# Patient Record
Sex: FEMALE | Race: WHITE | NOT HISPANIC OR LATINO | URBAN - METROPOLITAN AREA
[De-identification: names, ages, dates, MRNs, and addresses within clinical notes are randomized per-mention and may not be internally consistent; named-entity substitution may affect disease eponyms.]

---

## 2021-07-28 ENCOUNTER — EMERGENCY (EMERGENCY)
Facility: HOSPITAL | Age: 66
LOS: 1 days | Discharge: HOME | End: 2021-07-28
Attending: EMERGENCY MEDICINE
Payer: MEDICARE

## 2021-07-28 VITALS
HEART RATE: 77 BPM | OXYGEN SATURATION: 97 % | RESPIRATION RATE: 18 BRPM | HEIGHT: 67 IN | SYSTOLIC BLOOD PRESSURE: 163 MMHG | DIASTOLIC BLOOD PRESSURE: 77 MMHG | TEMPERATURE: 98 F | WEIGHT: 160.06 LBS

## 2021-07-28 VITALS
OXYGEN SATURATION: 96 % | RESPIRATION RATE: 14 BRPM | HEART RATE: 83 BPM | SYSTOLIC BLOOD PRESSURE: 107 MMHG | DIASTOLIC BLOOD PRESSURE: 96 MMHG

## 2021-07-28 LAB
ALBUMIN SERPL ELPH-MCNC: 4.3 G/DL — SIGNIFICANT CHANGE UP (ref 3.3–5)
ALP SERPL-CCNC: 86 U/L — SIGNIFICANT CHANGE UP (ref 40–120)
ALT FLD-CCNC: 14 U/L — SIGNIFICANT CHANGE UP (ref 10–45)
ANION GAP SERPL CALC-SCNC: 13 MMOL/L — SIGNIFICANT CHANGE UP (ref 5–17)
APPEARANCE UR: CLEAR — SIGNIFICANT CHANGE UP
AST SERPL-CCNC: 22 U/L — SIGNIFICANT CHANGE UP (ref 10–40)
BACTERIA # UR AUTO: NEGATIVE — SIGNIFICANT CHANGE UP
BASOPHILS # BLD AUTO: 0.07 K/UL — SIGNIFICANT CHANGE UP (ref 0–0.2)
BASOPHILS NFR BLD AUTO: 0.9 % — SIGNIFICANT CHANGE UP (ref 0–2)
BILIRUB SERPL-MCNC: 0.2 MG/DL — SIGNIFICANT CHANGE UP (ref 0.2–1.2)
BILIRUB UR-MCNC: NEGATIVE — SIGNIFICANT CHANGE UP
BUN SERPL-MCNC: 11 MG/DL — SIGNIFICANT CHANGE UP (ref 7–23)
CALCIUM SERPL-MCNC: 9.4 MG/DL — SIGNIFICANT CHANGE UP (ref 8.4–10.5)
CHLORIDE SERPL-SCNC: 104 MMOL/L — SIGNIFICANT CHANGE UP (ref 96–108)
CO2 SERPL-SCNC: 22 MMOL/L — SIGNIFICANT CHANGE UP (ref 22–31)
COLOR SPEC: SIGNIFICANT CHANGE UP
CREAT SERPL-MCNC: 0.78 MG/DL — SIGNIFICANT CHANGE UP (ref 0.5–1.3)
DIFF PNL FLD: NEGATIVE — SIGNIFICANT CHANGE UP
EOSINOPHIL # BLD AUTO: 0.2 K/UL — SIGNIFICANT CHANGE UP (ref 0–0.5)
EOSINOPHIL NFR BLD AUTO: 2.6 % — SIGNIFICANT CHANGE UP (ref 0–6)
EPI CELLS # UR: 1 /HPF — SIGNIFICANT CHANGE UP
GLUCOSE SERPL-MCNC: 129 MG/DL — HIGH (ref 70–99)
GLUCOSE UR QL: NEGATIVE — SIGNIFICANT CHANGE UP
HCT VFR BLD CALC: 36.6 % — SIGNIFICANT CHANGE UP (ref 34.5–45)
HGB BLD-MCNC: 12.4 G/DL — SIGNIFICANT CHANGE UP (ref 11.5–15.5)
HYALINE CASTS # UR AUTO: 1 /LPF — SIGNIFICANT CHANGE UP (ref 0–2)
IMM GRANULOCYTES NFR BLD AUTO: 0.7 % — SIGNIFICANT CHANGE UP (ref 0–1.5)
KETONES UR-MCNC: NEGATIVE — SIGNIFICANT CHANGE UP
LEUKOCYTE ESTERASE UR-ACNC: ABNORMAL
LYMPHOCYTES # BLD AUTO: 2.43 K/UL — SIGNIFICANT CHANGE UP (ref 1–3.3)
LYMPHOCYTES # BLD AUTO: 31.9 % — SIGNIFICANT CHANGE UP (ref 13–44)
MCHC RBC-ENTMCNC: 31.6 PG — SIGNIFICANT CHANGE UP (ref 27–34)
MCHC RBC-ENTMCNC: 33.9 GM/DL — SIGNIFICANT CHANGE UP (ref 32–36)
MCV RBC AUTO: 93.4 FL — SIGNIFICANT CHANGE UP (ref 80–100)
MONOCYTES # BLD AUTO: 0.58 K/UL — SIGNIFICANT CHANGE UP (ref 0–0.9)
MONOCYTES NFR BLD AUTO: 7.6 % — SIGNIFICANT CHANGE UP (ref 2–14)
NEUTROPHILS # BLD AUTO: 4.29 K/UL — SIGNIFICANT CHANGE UP (ref 1.8–7.4)
NEUTROPHILS NFR BLD AUTO: 56.3 % — SIGNIFICANT CHANGE UP (ref 43–77)
NITRITE UR-MCNC: NEGATIVE — SIGNIFICANT CHANGE UP
NRBC # BLD: 0 /100 WBCS — SIGNIFICANT CHANGE UP (ref 0–0)
PH UR: 6 — SIGNIFICANT CHANGE UP (ref 5–8)
PLATELET # BLD AUTO: 277 K/UL — SIGNIFICANT CHANGE UP (ref 150–400)
POTASSIUM SERPL-MCNC: 3.6 MMOL/L — SIGNIFICANT CHANGE UP (ref 3.5–5.3)
POTASSIUM SERPL-SCNC: 3.6 MMOL/L — SIGNIFICANT CHANGE UP (ref 3.5–5.3)
PROT SERPL-MCNC: 7.4 G/DL — SIGNIFICANT CHANGE UP (ref 6–8.3)
PROT UR-MCNC: NEGATIVE — SIGNIFICANT CHANGE UP
RBC # BLD: 3.92 M/UL — SIGNIFICANT CHANGE UP (ref 3.8–5.2)
RBC # FLD: 12.5 % — SIGNIFICANT CHANGE UP (ref 10.3–14.5)
RBC CASTS # UR COMP ASSIST: 2 /HPF — SIGNIFICANT CHANGE UP (ref 0–4)
SODIUM SERPL-SCNC: 139 MMOL/L — SIGNIFICANT CHANGE UP (ref 135–145)
SP GR SPEC: 1.01 — SIGNIFICANT CHANGE UP (ref 1.01–1.02)
TROPONIN T, HIGH SENSITIVITY RESULT: <6 NG/L — SIGNIFICANT CHANGE UP (ref 0–51)
UROBILINOGEN FLD QL: NEGATIVE — SIGNIFICANT CHANGE UP
WBC # BLD: 7.62 K/UL — SIGNIFICANT CHANGE UP (ref 3.8–10.5)
WBC # FLD AUTO: 7.62 K/UL — SIGNIFICANT CHANGE UP (ref 3.8–10.5)
WBC UR QL: 28 /HPF — HIGH (ref 0–5)

## 2021-07-28 PROCEDURE — 99285 EMERGENCY DEPT VISIT HI MDM: CPT

## 2021-07-28 PROCEDURE — 93010 ELECTROCARDIOGRAM REPORT: CPT

## 2021-07-28 PROCEDURE — 71046 X-RAY EXAM CHEST 2 VIEWS: CPT | Mod: 26

## 2021-07-28 RX ORDER — SODIUM CHLORIDE 9 MG/ML
1000 INJECTION, SOLUTION INTRAVENOUS ONCE
Refills: 0 | Status: COMPLETED | OUTPATIENT
Start: 2021-07-28 | End: 2021-07-28

## 2021-07-28 RX ADMIN — SODIUM CHLORIDE 1000 MILLILITER(S): 9 INJECTION, SOLUTION INTRAVENOUS at 20:46

## 2021-07-28 RX ADMIN — SODIUM CHLORIDE 1000 MILLILITER(S): 9 INJECTION, SOLUTION INTRAVENOUS at 22:08

## 2021-07-28 NOTE — ED ADULT NURSE NOTE - OBJECTIVE STATEMENT
68 y/o 67 y/o female presenting to ED by EMS, A&Ox3, complaining of syncope. Pt states after she had dinner she started experiencing cramping abdominal pain and "feeling foggy", pt went to the bathroom and was found by  unconscious on the bathroom floor. Pt states she felt dizzy and lightheaded after one episode of diarrhea and then fainted. Denies taking anticoagulants.  Pt denies head trauma, headache, changes in vision, chest pain, shortness of breath, fevers/chills, n/v, hematuria, bloody diarrhea, weakness, numbness or tingling, pain. Neuro intact, extremities equal in sensation and strength, lungs clear, abd soft nontender, no swelling/edema noted. Pt placed on cardiac monitor showing NSR. Safety and comfort measures provided, bed locked and in lowest position, side rails up for safety. Call bell within reach. Awaiting results.

## 2021-07-28 NOTE — ED PROVIDER NOTE - NSFOLLOWUPCLINICS_GEN_ALL_ED_FT
[facility]  Neurology  [address] [place] [address] [address] [place] [address]  Phone: [phone]  Fax:

## 2021-07-28 NOTE — ED PROVIDER NOTE - OBJECTIVE STATEMENT
65 y/o F, PMH of hypothyroidism, presents to ED s/p syncope episode. Pt reports that around 6pm today she started feeling lightheaded, and felt abd cramping. She went to the bathroom to have a BM, and then fainted while on the toilet.  heard the fall and found pt unresponsive in the bathroom. Denies shaking or incontinence. Pt was not out for about 1 min, and when she woke up she was back to baseline. Pt states "I feel fine." Pt denies head injury, HA, vision changes, CP, SOB, abd pain, n/v/d, weakness, numbness, dizziness, or any other symptoms at this time.

## 2021-07-28 NOTE — ED PROVIDER NOTE - PROGRESS NOTE DETAILS
Delgado Braswell PGY2: Pt was re-evaluated at bedside, VSS, feeling better overall. Results were discussed with patient as well as return precautions and follow up plan with PCP and/or specialist. Time was taken to answer any questions that the patient had before providing them with discharge paperwork.

## 2021-07-28 NOTE — ED PROVIDER NOTE - ATTENDING CONTRIBUTION TO CARE
diarrhea x 1 , got up then loc. no inj. feeling fine now  no spinal ttp  clear lung rrr  ekg / bg / ivf / lytes

## 2021-07-28 NOTE — ED PROVIDER NOTE - NSFOLLOWUPINSTRUCTIONS_ED_ALL_ED_FT
Syncope    Syncope is when you temporarily lose consciousness, also called fainting or passing out. It is caused by a sudden decrease in blood flow to the brain. Even though most causes of syncope are not dangerous, syncope can possibly be a sign of a serious medical problem. Signs that you may be about to faint include feeling dizzy, lightheaded, nausea, visual changes, or cold/clammy skin. Do not drive, operate heavy machinery, or play sports until your health care provider says it is okay.    SEEK IMMEDIATE MEDICAL CARE IF YOU HAVE ANY OF THE FOLLOWING SYMPTOMS: severe headache, pain in your chest/abdomen/back, bleeding from your mouth or rectum, palpitations, shortness of breath, pain with breathing, seizure, confusion, or trouble walking.    Please follow up with neurology within 1 week.

## 2021-07-28 NOTE — ED PROVIDER NOTE - CLINICAL SUMMARY MEDICAL DECISION MAKING FREE TEXT BOX
67 y/o F, PMH of hypothyroidism, presents to ED s/p syncope episode. Prodrome lightheadedness. Happened while having BM. No shaking, tongue biting, or incontinence. Lasted about 1 min. Denies head injury. No post ictal state.  VSS. Physical exam unremarkable.  Likely vasovagal syncope. Plan to check labs, EKG, UA, trop, CXR, and reassess.

## 2021-07-28 NOTE — ED PROVIDER NOTE - PATIENT PORTAL LINK FT
You can access the FollowMyHealth Patient Portal offered by [facility] by registering at the following website: [email or web address]. By joining [facility]’s FollowMyHealth portal, you will also be able to view your health information using other applications (apps) compatible with our system.

## 2021-07-29 ENCOUNTER — INPATIENT (INPATIENT)
Facility: HOSPITAL | Age: 66
LOS: 1 days | Discharge: HOME | DRG: 392 | End: 2021-07-31
Attending: INTERNAL MEDICINE | Admitting: INTERNAL MEDICINE
Payer: MEDICARE

## 2021-07-29 VITALS
RESPIRATION RATE: 18 BRPM | WEIGHT: 160.06 LBS | DIASTOLIC BLOOD PRESSURE: 85 MMHG | OXYGEN SATURATION: 98 % | TEMPERATURE: 98 F | HEART RATE: 109 BPM | HEIGHT: 67 IN | SYSTOLIC BLOOD PRESSURE: 167 MMHG

## 2021-07-29 DIAGNOSIS — K52.9 NONINFECTIVE GASTROENTERITIS AND COLITIS, UNSPECIFIED: ICD-10-CM

## 2021-07-29 LAB
ALBUMIN SERPL ELPH-MCNC: 4.5 G/DL — SIGNIFICANT CHANGE UP (ref 3.3–5)
ALP SERPL-CCNC: 94 U/L — SIGNIFICANT CHANGE UP (ref 40–120)
ALT FLD-CCNC: 13 U/L — SIGNIFICANT CHANGE UP (ref 10–45)
ANION GAP SERPL CALC-SCNC: 12 MMOL/L — SIGNIFICANT CHANGE UP (ref 5–17)
AST SERPL-CCNC: 20 U/L — SIGNIFICANT CHANGE UP (ref 10–40)
BASE EXCESS BLDV CALC-SCNC: 1.1 MMOL/L — SIGNIFICANT CHANGE UP (ref -2–2)
BASOPHILS # BLD AUTO: 0.05 K/UL — SIGNIFICANT CHANGE UP (ref 0–0.2)
BASOPHILS NFR BLD AUTO: 0.3 % — SIGNIFICANT CHANGE UP (ref 0–2)
BILIRUB SERPL-MCNC: 0.7 MG/DL — SIGNIFICANT CHANGE UP (ref 0.2–1.2)
BLD GP AB SCN SERPL QL: NEGATIVE — SIGNIFICANT CHANGE UP
BUN SERPL-MCNC: 11 MG/DL — SIGNIFICANT CHANGE UP (ref 7–23)
CA-I SERPL-SCNC: 1.2 MMOL/L — SIGNIFICANT CHANGE UP (ref 1.12–1.3)
CALCIUM SERPL-MCNC: 9.9 MG/DL — SIGNIFICANT CHANGE UP (ref 8.4–10.5)
CHLORIDE BLDV-SCNC: 102 MMOL/L — SIGNIFICANT CHANGE UP (ref 96–108)
CHLORIDE SERPL-SCNC: 99 MMOL/L — SIGNIFICANT CHANGE UP (ref 96–108)
CO2 BLDV-SCNC: 28 MMOL/L — SIGNIFICANT CHANGE UP (ref 22–30)
CO2 SERPL-SCNC: 22 MMOL/L — SIGNIFICANT CHANGE UP (ref 22–31)
CREAT SERPL-MCNC: 0.7 MG/DL — SIGNIFICANT CHANGE UP (ref 0.5–1.3)
CRP SERPL-MCNC: 48 MG/L — HIGH (ref 0–4)
CULTURE RESULTS: SIGNIFICANT CHANGE UP
EOSINOPHIL # BLD AUTO: 0.01 K/UL — SIGNIFICANT CHANGE UP (ref 0–0.5)
EOSINOPHIL NFR BLD AUTO: 0.1 % — SIGNIFICANT CHANGE UP (ref 0–6)
ERYTHROCYTE [SEDIMENTATION RATE] IN BLOOD: 31 MM/HR — HIGH (ref 0–20)
GAS PNL BLDV: 136 MMOL/L — SIGNIFICANT CHANGE UP (ref 135–145)
GAS PNL BLDV: SIGNIFICANT CHANGE UP
GAS PNL BLDV: SIGNIFICANT CHANGE UP
GLUCOSE BLDV-MCNC: 124 MG/DL — HIGH (ref 70–99)
GLUCOSE SERPL-MCNC: 117 MG/DL — HIGH (ref 70–99)
HCO3 BLDV-SCNC: 26 MMOL/L — SIGNIFICANT CHANGE UP (ref 21–29)
HCT VFR BLD CALC: 40.1 % — SIGNIFICANT CHANGE UP (ref 34.5–45)
HCT VFR BLDA CALC: 45 % — SIGNIFICANT CHANGE UP (ref 39–50)
HGB BLD CALC-MCNC: 14.7 G/DL — SIGNIFICANT CHANGE UP (ref 11.5–15.5)
HGB BLD-MCNC: 13.3 G/DL — SIGNIFICANT CHANGE UP (ref 11.5–15.5)
IMM GRANULOCYTES NFR BLD AUTO: 0.6 % — SIGNIFICANT CHANGE UP (ref 0–1.5)
LACTATE BLDV-MCNC: 2 MMOL/L — SIGNIFICANT CHANGE UP (ref 0.7–2)
LYMPHOCYTES # BLD AUTO: 1.19 K/UL — SIGNIFICANT CHANGE UP (ref 1–3.3)
LYMPHOCYTES # BLD AUTO: 7 % — LOW (ref 13–44)
MCHC RBC-ENTMCNC: 30.7 PG — SIGNIFICANT CHANGE UP (ref 27–34)
MCHC RBC-ENTMCNC: 33.2 GM/DL — SIGNIFICANT CHANGE UP (ref 32–36)
MCV RBC AUTO: 92.6 FL — SIGNIFICANT CHANGE UP (ref 80–100)
MONOCYTES # BLD AUTO: 1.26 K/UL — HIGH (ref 0–0.9)
MONOCYTES NFR BLD AUTO: 7.4 % — SIGNIFICANT CHANGE UP (ref 2–14)
NEUTROPHILS # BLD AUTO: 14.47 K/UL — HIGH (ref 1.8–7.4)
NEUTROPHILS NFR BLD AUTO: 84.6 % — HIGH (ref 43–77)
NRBC # BLD: 0 /100 WBCS — SIGNIFICANT CHANGE UP (ref 0–0)
OTHER CELLS CSF MANUAL: 7 ML/DL — LOW (ref 18–22)
PCO2 BLDV: 47 MMHG — SIGNIFICANT CHANGE UP (ref 35–50)
PH BLDV: 7.37 — SIGNIFICANT CHANGE UP (ref 7.35–7.45)
PLATELET # BLD AUTO: 308 K/UL — SIGNIFICANT CHANGE UP (ref 150–400)
PO2 BLDV: 23 MMHG — LOW (ref 25–45)
POTASSIUM BLDV-SCNC: 3.6 MMOL/L — SIGNIFICANT CHANGE UP (ref 3.5–5.3)
POTASSIUM SERPL-MCNC: 3.8 MMOL/L — SIGNIFICANT CHANGE UP (ref 3.5–5.3)
POTASSIUM SERPL-SCNC: 3.8 MMOL/L — SIGNIFICANT CHANGE UP (ref 3.5–5.3)
PROT SERPL-MCNC: 8.1 G/DL — SIGNIFICANT CHANGE UP (ref 6–8.3)
RBC # BLD: 4.33 M/UL — SIGNIFICANT CHANGE UP (ref 3.8–5.2)
RBC # FLD: 12.4 % — SIGNIFICANT CHANGE UP (ref 10.3–14.5)
RH IG SCN BLD-IMP: POSITIVE — SIGNIFICANT CHANGE UP
SAO2 % BLDV: 35 % — LOW (ref 67–88)
SARS-COV-2 RNA SPEC QL NAA+PROBE: SIGNIFICANT CHANGE UP
SODIUM SERPL-SCNC: 133 MMOL/L — LOW (ref 135–145)
SPECIMEN SOURCE: SIGNIFICANT CHANGE UP
TSH SERPL-MCNC: 0.54 UIU/ML — SIGNIFICANT CHANGE UP (ref 0.27–4.2)
WBC # BLD: 17.09 K/UL — HIGH (ref 3.8–10.5)
WBC # FLD AUTO: 17.09 K/UL — HIGH (ref 3.8–10.5)

## 2021-07-29 PROCEDURE — 74174 CTA ABD&PLVS W/CONTRAST: CPT | Mod: 26,MA

## 2021-07-29 PROCEDURE — 93010 ELECTROCARDIOGRAM REPORT: CPT

## 2021-07-29 PROCEDURE — 99285 EMERGENCY DEPT VISIT HI MDM: CPT

## 2021-07-29 RX ORDER — CIPROFLOXACIN LACTATE 400MG/40ML
400 VIAL (ML) INTRAVENOUS EVERY 12 HOURS
Refills: 0 | Status: DISCONTINUED | OUTPATIENT
Start: 2021-07-29 | End: 2021-07-31

## 2021-07-29 RX ORDER — LEVOTHYROXINE SODIUM 125 MCG
0.14 TABLET ORAL
Qty: 0 | Refills: 0 | DISCHARGE

## 2021-07-29 RX ORDER — SODIUM CHLORIDE 9 MG/ML
1000 INJECTION INTRAMUSCULAR; INTRAVENOUS; SUBCUTANEOUS ONCE
Refills: 0 | Status: COMPLETED | OUTPATIENT
Start: 2021-07-29 | End: 2021-07-29

## 2021-07-29 RX ORDER — CIPROFLOXACIN LACTATE 400MG/40ML
400 VIAL (ML) INTRAVENOUS ONCE
Refills: 0 | Status: COMPLETED | OUTPATIENT
Start: 2021-07-29 | End: 2021-07-29

## 2021-07-29 RX ORDER — METRONIDAZOLE 500 MG
500 TABLET ORAL EVERY 8 HOURS
Refills: 0 | Status: DISCONTINUED | OUTPATIENT
Start: 2021-07-29 | End: 2021-07-31

## 2021-07-29 RX ORDER — METRONIDAZOLE 500 MG
500 TABLET ORAL ONCE
Refills: 0 | Status: COMPLETED | OUTPATIENT
Start: 2021-07-29 | End: 2021-07-29

## 2021-07-29 RX ORDER — LEVOTHYROXINE SODIUM 125 MCG
137 TABLET ORAL
Refills: 0 | Status: DISCONTINUED | OUTPATIENT
Start: 2021-07-29 | End: 2021-07-31

## 2021-07-29 RX ORDER — SODIUM CHLORIDE 9 MG/ML
1000 INJECTION, SOLUTION INTRAVENOUS
Refills: 0 | Status: DISCONTINUED | OUTPATIENT
Start: 2021-07-29 | End: 2021-07-31

## 2021-07-29 RX ADMIN — SODIUM CHLORIDE 1000 MILLILITER(S): 9 INJECTION INTRAMUSCULAR; INTRAVENOUS; SUBCUTANEOUS at 11:18

## 2021-07-29 RX ADMIN — Medication 100 MILLIGRAM(S): at 13:44

## 2021-07-29 RX ADMIN — Medication 200 MILLIGRAM(S): at 23:17

## 2021-07-29 RX ADMIN — Medication 100 MILLIGRAM(S): at 21:48

## 2021-07-29 RX ADMIN — Medication 200 MILLIGRAM(S): at 12:40

## 2021-07-29 RX ADMIN — SODIUM CHLORIDE 75 MILLILITER(S): 9 INJECTION, SOLUTION INTRAVENOUS at 15:34

## 2021-07-29 NOTE — H&P ADULT - HISTORY OF PRESENT ILLNESS
67 yo woman with h/o hypothyroidism and diverticulosis who seen yesterday for a syncopal episode during BM. ptn was evaluated given IVF and sent home. Today continues to have lower abd cramping pain and loose stools x2d. This am noted BRB in the toilet x3 with possible clots. Never had these sx before. No recent abx, blood thinner use or travel. No lightheadedness/dizziness and SOB. Admits to palpitations. reports feeling hungry  had a colonoscopy 7/1/2021, was told she had tics. mammogram is UTD, annual physicals UTD

## 2021-07-29 NOTE — ED PROVIDER NOTE - OBJECTIVE STATEMENT
pt with hypothyroidism and diverticulosis seen yesterday for a syncopal episode during BM continues to have lower abd cramping pain and loose stools x2d. This am noted BRB in the toilet x3 with possible clots. Never had these sx before. No recent abx, blood thinner use or travel. No lightheadedness/dizziness and SOB. Admits to palpitations.

## 2021-07-29 NOTE — CONSULT NOTE ADULT - ASSESSMENT
67 yo woman with h/o hypothyroidism and diverticulosis who seen yesterday for a syncopal episode during BM. ptn was evaluated given IVF and sent home. Today continues to have lower abd cramping pain and loose stools x2d. This am noted BRB in the toilet x3 with possible clots. Never had these sx before. No recent abx, blood thinner use or travel. No lightheadedness/dizziness and SOB. Admits to palpitations. reports feeling hungry  had a colonoscopy 7/1/2021, was told she had tics. mammogram is UTD, annual physicals UTD (29 Jul 2021 12:29)    ER vss, Tmax 99.5 (R), P 86, /77.  WBC 17 <-- 7.6.  CRP 48.  UA large LE, (-) nit.  CTap shows nonspecific enteritis.  Pt states she had catered food the day prior to her abd symptoms, remembers eating chicken.   Denies recent travel, sick contacts, no recent abx exposure.  Pt started on broad spectrum abx.  ID consult called for further abx management.     Infectious enteritis:    - Recently ate catered food prior to onset of symptoms.  c/o lower abd cramping and loose stools with BRB.      - Cont cipro/flagyl.  Check stool gi pcr, stool cx, o&p.  No recent abx exposure to s/o cdiff risk.    - GI eval called    - If pt spikes fever, send bcx x 2    - Monitor serial abd exam, replete electrolytes prn, gentle ivf hydration, pain control    Will follow,    Annamaria Holguin  [phone]

## 2021-07-29 NOTE — CONSULT NOTE ADULT - SUBJECTIVE AND OBJECTIVE BOX
Patient is a 66y old  Female who presents with a chief complaint of colitis (2021 14:54)      HPI:    65 yo woman with h/o hypothyroidism and diverticulosis who seen yesterday for a syncopal episode during BM. ptn was evaluated given IVF and sent home. Today continues to have lower abd cramping pain and loose stools x2d. This am noted BRB in the toilet x3 with possible clots. Never had these sx before. No recent abx, blood thinner use or travel. No lightheadedness/dizziness and SOB. Admits to palpitations. reports feeling hungry  had a colonoscopy 2021, was told she had tics. mammogram is UTD, annual physicals UTD (2021 12:29)    ER vss, Tmax 99.5 (R), P 86, /77.  WBC 17 <-- 7.6.  CRP 48.  UA large LE, (-) nit.  CTap shows nonspecific enteritis.  Pt started on broad spectrum abx.  ID consult called for further abx management.        REVIEW OF SYSTEMS:    CONSTITUTIONAL: No fever, weight loss, or fatigue  EYES: No eye pain, visual disturbances, or discharge  ENMT:  No sore throat  NECK: No pain or stiffness  RESPIRATORY: No cough, wheezing, chills or hemoptysis; No shortness of breath  CARDIOVASCULAR: No chest pain, palpitations, dizziness, or leg swelling  GASTROINTESTINAL: No abdominal or epigastric pain. No nausea, vomiting, or hematemesis; No diarrhea or constipation. No melena or hematochezia.  GENITOURINARY: No dysuria, frequency, hematuria, or incontinence  NEUROLOGICAL: No headaches, memory loss, loss of strength, numbness, or tremors  SKIN: No itching, burning, rashes, or lesions   LYMPH NODES: No enlarged glands  MUSCULOSKELETAL: No joint pain or swelling; No muscle, back, or extremity pain      PAST MEDICAL & SURGICAL HISTORY:  Hypothyroid    Diverticulosis        Allergies    penicillin (Rash)    Intolerances        FAMILY HISTORY:  No pertinent fam hx in 1st degree relatives    SOCIAL HISTORY:        MEDICATIONS  (STANDING):  ciprofloxacin   IVPB 400 milliGRAM(s) IV Intermittent every 12 hours  levothyroxine 137 MICROGram(s) Oral <User Schedule>  metroNIDAZOLE  IVPB 500 milliGRAM(s) IV Intermittent every 8 hours  sodium chloride 0.45%. 1000 milliLiter(s) (75 mL/Hr) IV Continuous <Continuous>    MEDICATIONS  (PRN):      Vital Signs Last 24 Hrs  T(C): 37.2 (2021 16:23), Max: 37.5 (2021 14:44)  T(F): 98.9 (2021 16:23), Max: 99.5 (2021 14:44)  HR: 86 (2021 16:23) (77 - 109)  BP: 124/75 (2021 16:23) (107/96 - 167/85)  BP(mean): 96 (2021 22:06) (96 - 96)  RR: 18 (2021 16:23) (14 - 18)  SpO2: 98% (2021 16:23) (96% - 99%)    PHYSICAL EXAM:    GENERAL: NAD, well-groomed  HEAD:  Atraumatic, Normocephalic  EYES: EOMI, PERRLA, conjunctiva and sclera clear  ENMT: No tonsillar erythema, exudates, or enlargement; Moist mucous membranes  NECK: Supple, No JVD  CHEST/LUNG: Clear to percussion bilaterally; No rales, rhonchi, wheezing, or rubs  HEART: Regular rate and rhythm; No murmurs, rubs, or gallops  ABDOMEN: Soft, Nontender, Nondistended; Bowel sounds present  EXTREMITIES:  2+ Peripheral Pulses, No clubbing, cyanosis, or edema  LYMPH: No lymphadenopathy noted  SKIN: No rashes or lesions    LABS:  CBC Full  -  ( 2021 10:18 )  WBC Count : 17.09 K/uL  RBC Count : 4.33 M/uL  Hemoglobin : 13.3 g/dL  Hematocrit : 40.1 %  Platelet Count - Automated : 308 K/uL  Mean Cell Volume : 92.6 fl  Mean Cell Hemoglobin : 30.7 pg  Mean Cell Hemoglobin Concentration : 33.2 gm/dL  Auto Neutrophil # : 14.47 K/uL  Auto Lymphocyte # : 1.19 K/uL  Auto Monocyte # : 1.26 K/uL  Auto Eosinophil # : 0.01 K/uL  Auto Basophil # : 0.05 K/uL  Auto Neutrophil % : 84.6 %  Auto Lymphocyte % : 7.0 %  Auto Monocyte % : 7.4 %  Auto Eosinophil % : 0.1 %  Auto Basophil % : 0.3 %          133<L>  |  99  |  11  ----------------------------<  117<H>  3.8   |  22  |  0.70    Ca    9.9      2021 10:18    TPro  8.1  /  Alb  4.5  /  TBili  0.7  /  DBili  x   /  AST  20  /  ALT  13  /  AlkPhos  94        LIVER FUNCTIONS - ( 2021 10:18 )  Alb: 4.5 g/dL / Pro: 8.1 g/dL / ALK PHOS: 94 U/L / ALT: 13 U/L / AST: 20 U/L / GGT: x                               MICROBIOLOGY:        Urinalysis Basic - ( 2021 21:11 )    Color: Light Yellow / Appearance: Clear / S.014 / pH: x  Gluc: x / Ketone: Negative  / Bili: Negative / Urobili: Negative   Blood: x / Protein: Negative / Nitrite: Negative   Leuk Esterase: Large / RBC: 2 /hpf / WBC 28 /HPF   Sq Epi: x / Non Sq Epi: 1 /hpf / Bacteria: Negative        RADIOLOGY:    < from: CT Angio Abdomen and Pelvis w/ IV Cont (21 @ 10:06) >  EXAM:  CT ANGIO ABD PELV (W)AW IC                            PROCEDURE DATE:  2021            INTERPRETATION:  CLINICAL INFORMATION: Evaluate for GI bleed.    COMPARISON: None.    CONTRAST/COMPLICATIONS:  IV Contrast: Omnipaque 350  90 cc administered   10 cc discarded  Oral Contrast: None  Complications: None reported at the time of study.    PROCEDURE:  CT of the Abdomen and Pelvis was performed.  Precontrast, Arterial and Delayed phases were performed.  Sagittal and coronal reformats were performed.    FINDINGS:  LOWER CHEST: Within normal limits.    LIVER: Within normal limits.  BILE DUCTS: Normal caliber.  GALLBLADDER: Within normal limits.  SPLEEN: Within normal limits.  PANCREAS: Within normal limits.  ADRENALS: Within normal limits.  KIDNEYS/URETERS: Normal opacification of the kidneys. No hydronephrosis.    BLADDER: Within normal limits.  REPRODUCTIVE ORGANS: Uterus and adnexa within normal limits.    BOWEL: No contrast extravasation to suggest an acute GI bleed. Pericolonic stranding throughout the mid to distal descending colon extending to the proximal sigmoid without any discrete diverticuli. No bowel obstruction. Appendix is normal.  PERITONEUM: No ascites.  VESSELS: Within normal limits.  RETROPERITONEUM/LYMPH NODES: No lymphadenopathy.  ABDOMINAL WALL: Within normal limits.  BONES: Mild degenerative changes of the lumbar spine.    IMPRESSION:    No acute GI bleed.  Findings suggestive of nonspecific enteritis possibly infectious in origin, correlate clinically.    < end of copied text >          < from: Xray Chest 2 Views PA/Lat (21 @ 20:27) >  FINDINGS:    Density at the right heart border, likely epicardial fat. Lungs are clear. No pleural effusion or pneumothorax.  Cardiac size is not enlarged. No acute osseous finding.    IMPRESSION:  Clear lungs    < end of copied text >           Patient is a 66y old  Female who presents with a chief complaint of colitis (2021 14:54)      HPI:    67 yo woman with h/o hypothyroidism and diverticulosis who seen yesterday for a syncopal episode during BM. ptn was evaluated given IVF and sent home. Today continues to have lower abd cramping pain and loose stools x2d. This am noted BRB in the toilet x3 with possible clots. Never had these sx before. No recent abx, blood thinner use or travel. No lightheadedness/dizziness and SOB. Admits to palpitations. reports feeling hungry  had a colonoscopy 2021, was told she had tics. mammogram is UTD, annual physicals UTD (2021 12:29)    ER vss, Tmax 99.5 (R), P 86, /77.  WBC 17 <-- 7.6.  CRP 48.  UA large LE, (-) nit.  CTap shows nonspecific enteritis.  Pt states she had catered food the day prior to her abd symptoms, remembers eating chicken.   Denies recent travel, sick contacts, no recent abx exposure.  Pt started on broad spectrum abx.  ID consult called for further abx management.        REVIEW OF SYSTEMS:    CONSTITUTIONAL: No fever, weight loss, or fatigue  EYES: No eye pain, visual disturbances, or discharge  ENMT:  No sore throat  NECK: No pain or stiffness  RESPIRATORY: No cough, wheezing, chills or hemoptysis; No shortness of breath  CARDIOVASCULAR: No chest pain, palpitations, dizziness, or leg swelling  GASTROINTESTINAL: No abdominal or epigastric pain. No nausea, vomiting, or hematemesis; No diarrhea or constipation. No melena or hematochezia.  GENITOURINARY: No dysuria, frequency, hematuria, or incontinence  NEUROLOGICAL: No headaches, memory loss, loss of strength, numbness, or tremors  SKIN: No itching, burning, rashes, or lesions   LYMPH NODES: No enlarged glands  MUSCULOSKELETAL: No joint pain or swelling; No muscle, back, or extremity pain      PAST MEDICAL & SURGICAL HISTORY:  Hypothyroid    Diverticulosis        Allergies    penicillin (Rash)    Intolerances        FAMILY HISTORY:  No pertinent fam hx in 1st degree relatives    SOCIAL HISTORY:  , retired, homemaker, babysits      MEDICATIONS  (STANDING):  ciprofloxacin   IVPB 400 milliGRAM(s) IV Intermittent every 12 hours  levothyroxine 137 MICROGram(s) Oral <User Schedule>  metroNIDAZOLE  IVPB 500 milliGRAM(s) IV Intermittent every 8 hours  sodium chloride 0.45%. 1000 milliLiter(s) (75 mL/Hr) IV Continuous <Continuous>    MEDICATIONS  (PRN):      Vital Signs Last 24 Hrs  T(C): 37.2 (2021 16:23), Max: 37.5 (2021 14:44)  T(F): 98.9 (2021 16:23), Max: 99.5 (2021 14:44)  HR: 86 (2021 16:23) (77 - 109)  BP: 124/75 (2021 16:23) (107/96 - 167/85)  BP(mean): 96 (2021 22:06) (96 - 96)  RR: 18 (2021 16:23) (14 - 18)  SpO2: 98% (2021 16:23) (96% - 99%)    PHYSICAL EXAM:    GENERAL: NAD, well-groomed  HEAD:  Atraumatic, Normocephalic  EYES: EOMI, PERRLA, conjunctiva and sclera clear  ENMT: No tonsillar erythema, exudates, or enlargement; Moist mucous membranes  NECK: Supple, No JVD  CHEST/LUNG: Clear to percussion bilaterally; No rales, rhonchi, wheezing, or rubs  HEART: Regular rate and rhythm; No murmurs, rubs, or gallops  ABDOMEN: Soft, Nontender, Nondistended; Bowel sounds present  EXTREMITIES:  2+ Peripheral Pulses, No clubbing, cyanosis, or edema  LYMPH: No lymphadenopathy noted  SKIN: No rashes or lesions    LABS:  CBC Full  -  ( 2021 10:18 )  WBC Count : 17.09 K/uL  RBC Count : 4.33 M/uL  Hemoglobin : 13.3 g/dL  Hematocrit : 40.1 %  Platelet Count - Automated : 308 K/uL  Mean Cell Volume : 92.6 fl  Mean Cell Hemoglobin : 30.7 pg  Mean Cell Hemoglobin Concentration : 33.2 gm/dL  Auto Neutrophil # : 14.47 K/uL  Auto Lymphocyte # : 1.19 K/uL  Auto Monocyte # : 1.26 K/uL  Auto Eosinophil # : 0.01 K/uL  Auto Basophil # : 0.05 K/uL  Auto Neutrophil % : 84.6 %  Auto Lymphocyte % : 7.0 %  Auto Monocyte % : 7.4 %  Auto Eosinophil % : 0.1 %  Auto Basophil % : 0.3 %          133<L>  |  99  |  11  ----------------------------<  117<H>  3.8   |  22  |  0.70    Ca    9.9      2021 10:18    TPro  8.1  /  Alb  4.5  /  TBili  0.7  /  DBili  x   /  AST  20  /  ALT  13  /  AlkPhos  94        LIVER FUNCTIONS - ( 2021 10:18 )  Alb: 4.5 g/dL / Pro: 8.1 g/dL / ALK PHOS: 94 U/L / ALT: 13 U/L / AST: 20 U/L / GGT: x                               MICROBIOLOGY:        Urinalysis Basic - ( 2021 21:11 )    Color: Light Yellow / Appearance: Clear / S.014 / pH: x  Gluc: x / Ketone: Negative  / Bili: Negative / Urobili: Negative   Blood: x / Protein: Negative / Nitrite: Negative   Leuk Esterase: Large / RBC: 2 /hpf / WBC 28 /HPF   Sq Epi: x / Non Sq Epi: 1 /hpf / Bacteria: Negative        RADIOLOGY:    < from: CT Angio Abdomen and Pelvis w/ IV Cont (21 @ 10:06) >  EXAM:  CT ANGIO ABD PELV (W)AW IC                            PROCEDURE DATE:  2021            INTERPRETATION:  CLINICAL INFORMATION: Evaluate for GI bleed.    COMPARISON: None.    CONTRAST/COMPLICATIONS:  IV Contrast: Omnipaque 350  90 cc administered   10 cc discarded  Oral Contrast: None  Complications: None reported at the time of study.    PROCEDURE:  CT of the Abdomen and Pelvis was performed.  Precontrast, Arterial and Delayed phases were performed.  Sagittal and coronal reformats were performed.    FINDINGS:  LOWER CHEST: Within normal limits.    LIVER: Within normal limits.  BILE DUCTS: Normal caliber.  GALLBLADDER: Within normal limits.  SPLEEN: Within normal limits.  PANCREAS: Within normal limits.  ADRENALS: Within normal limits.  KIDNEYS/URETERS: Normal opacification of the kidneys. No hydronephrosis.    BLADDER: Within normal limits.  REPRODUCTIVE ORGANS: Uterus and adnexa within normal limits.    BOWEL: No contrast extravasation to suggest an acute GI bleed. Pericolonic stranding throughout the mid to distal descending colon extending to the proximal sigmoid without any discrete diverticuli. No bowel obstruction. Appendix is normal.  PERITONEUM: No ascites.  VESSELS: Within normal limits.  RETROPERITONEUM/LYMPH NODES: No lymphadenopathy.  ABDOMINAL WALL: Within normal limits.  BONES: Mild degenerative changes of the lumbar spine.    IMPRESSION:    No acute GI bleed.  Findings suggestive of nonspecific enteritis possibly infectious in origin, correlate clinically.    < end of copied text >          < from: Xray Chest 2 Views PA/Lat (21 @ 20:27) >  FINDINGS:    Density at the right heart border, likely epicardial fat. Lungs are clear. No pleural effusion or pneumothorax.  Cardiac size is not enlarged. No acute osseous finding.    IMPRESSION:  Clear lungs    < end of copied text >

## 2021-07-29 NOTE — ED ADULT NURSE NOTE - OBJECTIVE STATEMENT
pt is a 66 y.o. female complaining of rectal bleeding from last night. pt states that she had abdominal pain/cramping last night after dinner and got up and went to the bathroom and had a syncopal episode after having a bloody bowel movement. pt denies hitting her head when she fell. pt came into the ER last night after the syncopal episode and was discharged, pt then had 3 bloody BM. pt denies this ever happening before, any significant PMH, and denies being on any blood thinners. vital signs are stable, lungs are clear bilaterally, no lower extremity edema present, pt denies any chest pain or weakness, no abdominal pain on palpation, skin is dry and intact, bowel sounds present.  at bedside.

## 2021-07-29 NOTE — PATIENT PROFILE ADULT - NSPROHMSYMPCOND_GEN_A_NUR
"[facility]  IP Progress Note   LOS: 2 days   Patient Care Team:  Cesar Neri MD as PCP - General  Cesar Neri MD as PCP - Family Medicine  Cesar Neri MD as PCP - Claims Attributed    Chief Complaint: Follow up for CAD and Angina    Subjective Denies Dyspnea.  Eating OK. Continues to have intermittent 2/10 chest tightness which is brief and self-limiting.       Problem   Tobacco Abuse   Coronary Artery Disease Involving Native Coronary Artery of Native Heart    1.  [facility] 8-22-16  · Single-vessel CAD with total ostial occlusion of the LAD.  · Successful thrombectomy followed by PTCA/stenting of the LAD with 3.5 x 33 mm drug-coated stent reducing the 100% stenosis to 0%.  · No other significant CAD.  · Moderate left ventricular systolic dysfunction, ejection fraction 40-45%.  · Normal hemodynamics.        Cardiomyopathy (Resolved)    1.  [facility] 8-22-16  · Moderate left ventricular systolic dysfunction, ejection fraction 40-45%.  · Normal hemodynamics.    2. Echo 2-3-18:  · Left ventricular systolic function is normal. Estimated EF = 60%.  · Left ventricular wall thickness is consistent with mild concentric hypertrophy.  · Left ventricular diastolic dysfunction (grade I) consistent with impaired relaxation.         Tele: Sinus Rythym    Vitals:  Blood pressure 136/85, pulse 81, temperature 97.9 °F (36.6 °C), temperature source Oral, resp. rate 16, height 182 cm (71.65\"), weight 129 kg (284 lb), SpO2 99 %.     Intake/Output Summary (Last 24 hours) at 02/05/18 1047  Last data filed at 02/05/18 1025   Gross per 24 hour   Intake              360 ml   Output             2325 ml   Net            -1965 ml       Physical Exam:      General: alert, no acute distress, acyanotic, well developed, well nourished   Chest: Clear Auscultation and No Wheezes   CV: Heart sounds are normal.  Regular rate and rhythm without murmur, gallop or rub.   Extremities: negative    Results Review: "     I reviewed the patient's new clinical results.      Results from last 7 days  Lab Units 02/05/18  0531   WBC 10*3/mm3 6.57   HEMOGLOBIN g/dL 13.7   HEMATOCRIT % 41.7   PLATELETS 10*3/mm3 239       Results from last 7 days  Lab Units 02/03/18  0456 02/02/18  1848   SODIUM mmol/L 135 134*   POTASSIUM mmol/L 4.0 3.9   CHLORIDE mmol/L 104 104   CO2 mmol/L 25.0 23.8*   BUN mg/dL 13 12   CREATININE mg/dL 1.00 1.11   CALCIUM mg/dL 8.7 8.7   BILIRUBIN mg/dL  --  0.5   ALK PHOS U/L  --  126   ALT (SGPT) U/L  --  34   AST (SGOT) U/L  --  30   GLUCOSE mg/dL 87 122*       Lab Results   Component Value Date    TROPONINI 2.367 (C) 02/03/2018    TROPONINI 3.274 (C) 02/03/2018    TROPONINI 2.472 (C) 02/03/2018         Results from last 7 days  Lab Units 02/03/18  0456   CHOLESTEROL mg/dL 189   TRIGLYCERIDES mg/dL 458*   HDL CHOL mg/dL 32*   LDL CHOL mg/dL 128           Scheduled Meds:  aspirin 81 mg Oral Daily   buPROPion  mg Oral Daily   carvedilol 6.25 mg Oral BID With Meals   citalopram 40 mg Oral Daily   diazePAM 10 mg Oral Nightly   isosorbide mononitrate 90 mg Oral Daily   levothyroxine 50 mcg Oral Daily   Morphine 30 mg Oral Q12H   nicotine 1 patch Transdermal Q24H   pantoprazole 40 mg Oral QAM   ranolazine 500 mg Oral BID   rosuvastatin 40 mg Oral Nightly   ticagrelor 90 mg Oral BID       Assessment/Plan:     Chest Pain  CAD, history NSTEMI stent to prox LAD  -The patient had a similar syndrome in 9/2017.  Repeat heart catheterization at that time revealed a widely patent stent and small vessel disease.  I suspect the patient again is having elevated troponins due to his small vessel disease, vasospasm.  Do not recommend a repeat heart catheterization at the current time.     -Continue aspirin, Brilinta, carvedilol, rosuvastatin  -Imdur increased to at 90 mg daily yesterday and started Ranexa 500 mg twice a day     -If the pain recurs and is more severe, or his troponin significantly elevates, or there are ECG  changes suggestive of active ischemia, will repeat heart catheterization, we will focus on optimal medical management for now.  -Overall condition is stable, we will discharge to home.      Leukemia, in remission  Hypertension  Mixed hyperlipidemia  Chronic noncardiac pain  Poor Memory  Oral tobacco abuse, counceled    Follow up with [facility] in [facility] in one month      ROBBIN Talavera  02/05/18  10:47 AM      I have seen and examined the patient, case was discussed with the physician extender, reviewed the above note, necessary changes were made and I agree with the final note.   Devorah Hightower MD, FACC, [facility]                             none

## 2021-07-29 NOTE — ED PROVIDER NOTE - PROGRESS NOTE DETAILS
Labs and imaging positive for pericolonic stranding, leukocytosis with neutrophilic predominance and hyponatremia of 133. Likely infectious colitis. Will admit and start IV abx. spoke to Dr. Minor, who admits for Dr. Armstrong. Agrees to take the patient and agrees with cipro/flagyl as treatment secondary to PCN allergy

## 2021-07-29 NOTE — H&P ADULT - ASSESSMENT
65 yo woman with h/o hypothyroidism and diverticulosis who seen yesterday for a syncopal episode during BM. ptn was evaluated given IVF and sent home. Today continues to have lower abd cramping pain and loose stools x2d. This am noted BRB in the toilet x3 with possible clots. Never had these sx before. No recent abx, blood thinner use or travel. No lightheadedness/dizziness and SOB. Admits to palpitations. reports feeling hungry  had a colonoscopy 7/1/2021, was told she had tics. mammogram is UTD, annual physicals UTD    ED findings: elevated wbc to 17K, colitis on CT A/P  GI called, ptn was pancultured. ischemic colitis not ruled out but ptn w no CVS issues in the past nor atherosclerosis/DM/HTN. will place on clears, IV Cipro/Flagyl. awaiting ID and GI consults. dvt ppx w PAS 2/2 BRBPR

## 2021-07-29 NOTE — ED PROVIDER NOTE - CLINICAL SUMMARY MEDICAL DECISION MAKING FREE TEXT BOX
66F p/w 3 episodes of BRBPR this morning in setting of ED visit yesterday for syncopal episode after diarrhea.  Pt with lower abd cramping, no TTP, well appearing but mildly tachycardic, no conjunctival pallor.  Concern for diverticular bleed - will check labs, CT and reassess.  - Alesha Lopez, DO 66F p/w 3 episodes of BRBPR this morning in setting of ED visit yesterday for syncopal episode after diarrhea.  Pt with lower abd cramping, no TTP, well appearing but mildly tachycardic, no conjunctival pallor.  Concern for diverticular bleed - will check labs, CT and reassess.  - Alesha Lopez, DO        pt seen yesterday for abdominal cramping and syncopal episode returns with continued watery diarrhea with passage of blood RI x3 this am with clots. diverticulosis v diverticulitis v colitis. repeat labs plus VBG and type and screen in case resuscitation is required. patient stable at this time.

## 2021-07-29 NOTE — H&P ADULT - NSHPPHYSICALEXAM_GEN_ALL_CORE
T(F): 99.4 (07-29-21 @ 09:48), Max: 99.4 (07-29-21 @ 09:48)  HR: 92 (07-29-21 @ 09:48) (77 - 109)  BP: 157/87 (07-29-21 @ 09:48) (107/96 - 167/85)  RR: 17 (07-29-21 @ 09:48) (14 - 18)  SpO2: 99% (07-29-21 @ 09:48) (96% - 99%)    PHYSICAL EXAM:  GENERAL: NAD, well-developed  HEAD:  Atraumatic, Normocephalic  EYES: EOMI, PERRLA, conjunctiva and sclera clear  NECK: Supple, No JVD  CHEST/LUNG: Clear to auscultation bilaterally; No wheeze  HEART: Regular rate and rhythm; No murmurs, rubs, or gallops  ABDOMEN: Soft, Nontender, Nondistended; Bowel sounds present  EXTREMITIES:  2+ Peripheral Pulses, No clubbing, cyanosis, or edema  PSYCH: AAOx3  NEUROLOGY: non-focal  SKIN: No rashes or lesions

## 2021-07-30 LAB
ANION GAP SERPL CALC-SCNC: 12 MMOL/L — SIGNIFICANT CHANGE UP (ref 5–17)
BUN SERPL-MCNC: 6 MG/DL — LOW (ref 7–23)
CALCIUM SERPL-MCNC: 9.1 MG/DL — SIGNIFICANT CHANGE UP (ref 8.4–10.5)
CHLORIDE SERPL-SCNC: 103 MMOL/L — SIGNIFICANT CHANGE UP (ref 96–108)
CHOLEST SERPL-MCNC: 201 MG/DL — HIGH
CO2 SERPL-SCNC: 22 MMOL/L — SIGNIFICANT CHANGE UP (ref 22–31)
COVID-19 SPIKE DOMAIN AB INTERP: POSITIVE
COVID-19 SPIKE DOMAIN ANTIBODY RESULT: >250 U/ML — HIGH
CREAT SERPL-MCNC: 0.7 MG/DL — SIGNIFICANT CHANGE UP (ref 0.5–1.3)
CULTURE RESULTS: SIGNIFICANT CHANGE UP
GLUCOSE SERPL-MCNC: 117 MG/DL — HIGH (ref 70–99)
HCT VFR BLD CALC: 37.2 % — SIGNIFICANT CHANGE UP (ref 34.5–45)
HCV AB S/CO SERPL IA: 0.31 S/CO — SIGNIFICANT CHANGE UP (ref 0–0.99)
HCV AB SERPL-IMP: SIGNIFICANT CHANGE UP
HDLC SERPL-MCNC: 80 MG/DL — SIGNIFICANT CHANGE UP
HGB BLD-MCNC: 12.1 G/DL — SIGNIFICANT CHANGE UP (ref 11.5–15.5)
LIPID PNL WITH DIRECT LDL SERPL: 111 MG/DL — HIGH
MCHC RBC-ENTMCNC: 30.9 PG — SIGNIFICANT CHANGE UP (ref 27–34)
MCHC RBC-ENTMCNC: 32.5 GM/DL — SIGNIFICANT CHANGE UP (ref 32–36)
MCV RBC AUTO: 95.1 FL — SIGNIFICANT CHANGE UP (ref 80–100)
NON HDL CHOLESTEROL: 121 MG/DL — SIGNIFICANT CHANGE UP
NRBC # BLD: 0 /100 WBCS — SIGNIFICANT CHANGE UP (ref 0–0)
OB PNL STL: POSITIVE
PLATELET # BLD AUTO: 247 K/UL — SIGNIFICANT CHANGE UP (ref 150–400)
POTASSIUM SERPL-MCNC: 3.4 MMOL/L — LOW (ref 3.5–5.3)
POTASSIUM SERPL-SCNC: 3.4 MMOL/L — LOW (ref 3.5–5.3)
RBC # BLD: 3.91 M/UL — SIGNIFICANT CHANGE UP (ref 3.8–5.2)
RBC # FLD: 12.6 % — SIGNIFICANT CHANGE UP (ref 10.3–14.5)
SARS-COV-2 IGG+IGM SERPL QL IA: >250 U/ML — HIGH
SARS-COV-2 IGG+IGM SERPL QL IA: POSITIVE
SODIUM SERPL-SCNC: 137 MMOL/L — SIGNIFICANT CHANGE UP (ref 135–145)
SPECIMEN SOURCE: SIGNIFICANT CHANGE UP
T4 FREE SERPL-MCNC: 1.4 NG/DL — SIGNIFICANT CHANGE UP (ref 0.9–1.8)
TRIGL SERPL-MCNC: 52 MG/DL — SIGNIFICANT CHANGE UP
WBC # BLD: 13.79 K/UL — HIGH (ref 3.8–10.5)
WBC # FLD AUTO: 13.79 K/UL — HIGH (ref 3.8–10.5)

## 2021-07-30 RX ORDER — POTASSIUM CHLORIDE 20 MEQ
20 PACKET (EA) ORAL ONCE
Refills: 0 | Status: COMPLETED | OUTPATIENT
Start: 2021-07-30 | End: 2021-07-30

## 2021-07-30 RX ADMIN — Medication 100 MILLIGRAM(S): at 22:12

## 2021-07-30 RX ADMIN — Medication 100 MILLIGRAM(S): at 13:58

## 2021-07-30 RX ADMIN — Medication 200 MILLIGRAM(S): at 16:38

## 2021-07-30 RX ADMIN — Medication 20 MILLIEQUIVALENT(S): at 13:58

## 2021-07-30 RX ADMIN — Medication 100 MILLIGRAM(S): at 05:31

## 2021-07-30 RX ADMIN — Medication 200 MILLIGRAM(S): at 05:31

## 2021-07-30 RX ADMIN — Medication 137 MICROGRAM(S): at 05:31

## 2021-07-31 VITALS — DIASTOLIC BLOOD PRESSURE: 67 MMHG | SYSTOLIC BLOOD PRESSURE: 138 MMHG | HEART RATE: 82 BPM

## 2021-07-31 LAB
ANION GAP SERPL CALC-SCNC: 10 MMOL/L — SIGNIFICANT CHANGE UP (ref 5–17)
BUN SERPL-MCNC: 6 MG/DL — LOW (ref 7–23)
CALCIUM SERPL-MCNC: 9.3 MG/DL — SIGNIFICANT CHANGE UP (ref 8.4–10.5)
CHLORIDE SERPL-SCNC: 106 MMOL/L — SIGNIFICANT CHANGE UP (ref 96–108)
CO2 SERPL-SCNC: 23 MMOL/L — SIGNIFICANT CHANGE UP (ref 22–31)
CREAT SERPL-MCNC: 0.7 MG/DL — SIGNIFICANT CHANGE UP (ref 0.5–1.3)
GLUCOSE SERPL-MCNC: 97 MG/DL — SIGNIFICANT CHANGE UP (ref 70–99)
HCT VFR BLD CALC: 38.3 % — SIGNIFICANT CHANGE UP (ref 34.5–45)
HGB BLD-MCNC: 12.2 G/DL — SIGNIFICANT CHANGE UP (ref 11.5–15.5)
MCHC RBC-ENTMCNC: 30.7 PG — SIGNIFICANT CHANGE UP (ref 27–34)
MCHC RBC-ENTMCNC: 31.9 GM/DL — LOW (ref 32–36)
MCV RBC AUTO: 96.2 FL — SIGNIFICANT CHANGE UP (ref 80–100)
NRBC # BLD: 0 /100 WBCS — SIGNIFICANT CHANGE UP (ref 0–0)
PLATELET # BLD AUTO: 261 K/UL — SIGNIFICANT CHANGE UP (ref 150–400)
POTASSIUM SERPL-MCNC: 3.8 MMOL/L — SIGNIFICANT CHANGE UP (ref 3.5–5.3)
POTASSIUM SERPL-SCNC: 3.8 MMOL/L — SIGNIFICANT CHANGE UP (ref 3.5–5.3)
RBC # BLD: 3.98 M/UL — SIGNIFICANT CHANGE UP (ref 3.8–5.2)
RBC # FLD: 12.3 % — SIGNIFICANT CHANGE UP (ref 10.3–14.5)
SODIUM SERPL-SCNC: 139 MMOL/L — SIGNIFICANT CHANGE UP (ref 135–145)
WBC # BLD: 11.04 K/UL — HIGH (ref 3.8–10.5)
WBC # FLD AUTO: 11.04 K/UL — HIGH (ref 3.8–10.5)

## 2021-07-31 PROCEDURE — 87046 STOOL CULTR AEROBIC BACT EA: CPT

## 2021-07-31 PROCEDURE — 86900 BLOOD TYPING SEROLOGIC ABO: CPT

## 2021-07-31 PROCEDURE — 80048 BASIC METABOLIC PNL TOTAL CA: CPT

## 2021-07-31 PROCEDURE — 82435 ASSAY OF BLOOD CHLORIDE: CPT

## 2021-07-31 PROCEDURE — 87086 URINE CULTURE/COLONY COUNT: CPT

## 2021-07-31 PROCEDURE — 86803 HEPATITIS C AB TEST: CPT

## 2021-07-31 PROCEDURE — 84132 ASSAY OF SERUM POTASSIUM: CPT

## 2021-07-31 PROCEDURE — 86769 SARS-COV-2 COVID-19 ANTIBODY: CPT

## 2021-07-31 PROCEDURE — 83605 ASSAY OF LACTIC ACID: CPT

## 2021-07-31 PROCEDURE — 96360 HYDRATION IV INFUSION INIT: CPT

## 2021-07-31 PROCEDURE — 85652 RBC SED RATE AUTOMATED: CPT

## 2021-07-31 PROCEDURE — 86901 BLOOD TYPING SEROLOGIC RH(D): CPT

## 2021-07-31 PROCEDURE — 93005 ELECTROCARDIOGRAM TRACING: CPT

## 2021-07-31 PROCEDURE — 81001 URINALYSIS AUTO W/SCOPE: CPT

## 2021-07-31 PROCEDURE — 82947 ASSAY GLUCOSE BLOOD QUANT: CPT

## 2021-07-31 PROCEDURE — 85027 COMPLETE CBC AUTOMATED: CPT

## 2021-07-31 PROCEDURE — 82803 BLOOD GASES ANY COMBINATION: CPT

## 2021-07-31 PROCEDURE — 85018 HEMOGLOBIN: CPT

## 2021-07-31 PROCEDURE — 99285 EMERGENCY DEPT VISIT HI MDM: CPT

## 2021-07-31 PROCEDURE — 84295 ASSAY OF SERUM SODIUM: CPT

## 2021-07-31 PROCEDURE — 80061 LIPID PANEL: CPT

## 2021-07-31 PROCEDURE — 84443 ASSAY THYROID STIM HORMONE: CPT

## 2021-07-31 PROCEDURE — 80053 COMPREHEN METABOLIC PANEL: CPT

## 2021-07-31 PROCEDURE — U0003: CPT

## 2021-07-31 PROCEDURE — 85025 COMPLETE CBC W/AUTO DIFF WBC: CPT

## 2021-07-31 PROCEDURE — 85014 HEMATOCRIT: CPT

## 2021-07-31 PROCEDURE — 99284 EMERGENCY DEPT VISIT MOD MDM: CPT | Mod: 25

## 2021-07-31 PROCEDURE — 82272 OCCULT BLD FECES 1-3 TESTS: CPT

## 2021-07-31 PROCEDURE — 71046 X-RAY EXAM CHEST 2 VIEWS: CPT

## 2021-07-31 PROCEDURE — 87507 IADNA-DNA/RNA PROBE TQ 12-25: CPT

## 2021-07-31 PROCEDURE — 82962 GLUCOSE BLOOD TEST: CPT

## 2021-07-31 PROCEDURE — U0005: CPT

## 2021-07-31 PROCEDURE — 84439 ASSAY OF FREE THYROXINE: CPT

## 2021-07-31 PROCEDURE — 74174 CTA ABD&PLVS W/CONTRAST: CPT | Mod: MA

## 2021-07-31 PROCEDURE — 86850 RBC ANTIBODY SCREEN: CPT

## 2021-07-31 PROCEDURE — 87045 FECES CULTURE AEROBIC BACT: CPT

## 2021-07-31 PROCEDURE — 84484 ASSAY OF TROPONIN QUANT: CPT

## 2021-07-31 PROCEDURE — 82330 ASSAY OF CALCIUM: CPT

## 2021-07-31 PROCEDURE — 86140 C-REACTIVE PROTEIN: CPT

## 2021-07-31 PROCEDURE — 87177 OVA AND PARASITES SMEARS: CPT

## 2021-07-31 RX ORDER — METRONIDAZOLE 500 MG
1 TABLET ORAL
Qty: 15 | Refills: 0
Start: 2021-07-31 | End: 2021-08-04

## 2021-07-31 RX ORDER — MOXIFLOXACIN HYDROCHLORIDE TABLETS, 400 MG 400 MG/1
1 TABLET, FILM COATED ORAL
Qty: 10 | Refills: 0
Start: 2021-07-31 | End: 2021-08-04

## 2021-07-31 RX ADMIN — Medication 200 MILLIGRAM(S): at 06:27

## 2021-07-31 RX ADMIN — Medication 137 MICROGRAM(S): at 05:25

## 2021-07-31 RX ADMIN — Medication 100 MILLIGRAM(S): at 05:25

## 2021-07-31 NOTE — PROGRESS NOTE ADULT - PROVIDER SPECIALTY LIST ADULT
Infectious Disease
Gastroenterology
Gastroenterology
Internal Medicine
Internal Medicine
Gastroenterology

## 2021-07-31 NOTE — DISCHARGE NOTE PROVIDER - HOSPITAL COURSE
65 yo woman with h/o hypothyroidism and diverticulosis who seen yesterday for a syncopal episode during BM. ptn was evaluated given IVF and sent home. Today continues to have lower abd cramping pain and loose stools x2d. This am noted BRB in the toilet x3 with possible clots. Never had these sx before. No recent abx, blood thinner use or travel. No lightheadedness/dizziness and SOB. Admits to palpitations. reports feeling hungry  had a colonoscopy 7/1/2021, was told she had tics. mammogram is UTD, annual physicals UTD (29 Jul 2021 12:29)    ER vss, Tmax 99.5 (R), P 86, /77.  WBC 17 <-- 7.6.  CRP 48.  UA large LE, (-) nit.  CTap shows nonspecific enteritis.  Pt states she had catered food the day prior to her abd symptoms, remembers eating chicken.   Denies recent travel, sick contacts, no recent abx exposure.  Pt started on broad spectrum abx.  ID consult called for further abx management.     Infectious enteritis:    - Recently ate catered food prior to onset of symptoms.  c/o lower abd cramping and loose stools with BRB.      - Cont cipro/flagyl.  Check stool gi pcr, stool cx, o&p.  No recent abx exposure to s/o cdiff risk.    - GI eval recommendations appreciated.     - If pt spikes fever, send bcx x 2    - Monitor serial abd exam, replete electrolytes prn, gentle ivf hydration, pain control      * GI pcr (-).  Feels better, still with stool with blood clots today.  No abd cramping.  Cont cipro/flagyl, treat for 7 to 10 days. 65 yo woman with h/o hypothyroidism and diverticulosis who seen yesterday for a syncopal episode during BM. ptn was evaluated given IVF and sent home. Today continues to have lower abd cramping pain and loose stools x2d. This am noted BRB in the toilet x3 with possible clots. Never had these sx before. No recent abx, blood thinner use or travel. No lightheadedness/dizziness and SOB. Admits to palpitations. reports feeling hungry  had a colonoscopy 7/1/2021, was told she had tics. mammogram is UTD, annual physicals UTD (29 Jul 2021 12:29)    ER vss, Tmax 99.5 (R), P 86, /77.  WBC 17 <-- 7.6.  CRP 48.  UA large LE, (-) nit.  CTap shows nonspecific enteritis.  Pt states she had catered food the day prior to her abd symptoms, remembers eating chicken.   Denies recent travel, sick contacts, no recent abx exposure.  Pt started on broad spectrum abx.  ID consult called for further abx management.     Infectious enteritis:    - Recently ate catered food prior to onset of symptoms.  c/o lower abd cramping and loose stools with BRB.      - Cont cipro/flagyl.  Check stool gi pcr, stool cx, o&p.  No recent abx exposure to s/o cdiff risk.    - GI eval recommendations appreciated.     - If pt spikes fever, send bcx x 2    - Monitor serial abd exam, replete electrolytes prn, gentle ivf hydration, pain control      * GI pcr (-).  Feels better, still with stool with blood clots today.  No abd cramping.  Cont cipro/flagyl, treat for 7 days.

## 2021-07-31 NOTE — PROGRESS NOTE ADULT - SUBJECTIVE AND OBJECTIVE BOX
Patient is a 66y old  Female who presents with a chief complaint of colitis (30 Jul 2021 19:55)      SUBJECTIVE / OVERNIGHT EVENTS: tolerating a regular diet, GI stool PCR is negative. DC planning when cleared by GI , prob on po ABx to complete course of ABx    MEDICATIONS  (STANDING):  ciprofloxacin   IVPB 400 milliGRAM(s) IV Intermittent every 12 hours  levothyroxine 137 MICROGram(s) Oral <User Schedule>  metroNIDAZOLE  IVPB 500 milliGRAM(s) IV Intermittent every 8 hours  sodium chloride 0.45%. 1000 milliLiter(s) (75 mL/Hr) IV Continuous <Continuous>    MEDICATIONS  (PRN):      Vital Signs Last 24 Hrs  T(F): 98.3 (07-30-21 @ 21:02), Max: 98.4 (07-30-21 @ 05:21)  HR: 85 (07-30-21 @ 21:02) (82 - 92)  BP: 157/91 (07-30-21 @ 21:02) (136/87 - 157/91)  RR: 18 (07-30-21 @ 21:02) (18 - 18)  SpO2: 97% (07-30-21 @ 21:02) (95% - 97%)  Telemetry:   CAPILLARY BLOOD GLUCOSE        I&O's Summary    29 Jul 2021 07:01  -  30 Jul 2021 07:00  --------------------------------------------------------  IN: 1980 mL / OUT: 0 mL / NET: 1980 mL    30 Jul 2021 07:01  -  30 Jul 2021 22:32  --------------------------------------------------------  IN: 1920 mL / OUT: 0 mL / NET: 1920 mL        PHYSICAL EXAM:  GENERAL: NAD, well-developed  HEAD:  Atraumatic, Normocephalic  EYES: EOMI, PERRLA, conjunctiva and sclera clear  NECK: Supple, No JVD  CHEST/LUNG: Clear to auscultation bilaterally; No wheeze  HEART: Regular rate and rhythm; No murmurs, rubs, or gallops  ABDOMEN: Soft, Nontender, Nondistended; Bowel sounds present  EXTREMITIES:  2+ Peripheral Pulses, No clubbing, cyanosis, or edema  PSYCH: AAOx3  NEUROLOGY: non-focal  SKIN: No rashes or lesions    LABS:                        12.1   13.79 )-----------( 247      ( 30 Jul 2021 06:28 )             37.2     07-30    137  |  103  |  6<L>  ----------------------------<  117<H>  3.4<L>   |  22  |  0.70    Ca    9.1      30 Jul 2021 06:28    TPro  8.1  /  Alb  4.5  /  TBili  0.7  /  DBili  x   /  AST  20  /  ALT  13  /  AlkPhos  94  07-29              RADIOLOGY & ADDITIONAL TESTS:    Imaging Personally Reviewed:    Consultant(s) Notes Reviewed:      Care Discussed with Consultants/Other Providers:  
JULIANO [id number]  66yFemale  T(C): 37.5 (07-29-21 @ 14:44), Max: 37.5 (07-29-21 @ 14:44)  HR: 86 (07-29-21 @ 14:44) (77 - 109)  BP: 147/77 (07-29-21 @ 14:44) (107/96 - 167/85)  RR: 18 (07-29-21 @ 14:44) (14 - 18)  SpO2: 97% (07-29-21 @ 14:44) (96% - 99%)  Wt(kg): --    
Patient is a 66y Female     Patient is a 66y old  Female who presents with a chief complaint of colitis (30 Jul 2021 22:32)      HPI:  67 yo woman with h/o hypothyroidism and diverticulosis who seen yesterday for a syncopal episode during BM. ptn was evaluated given IVF and sent home. Today continues to have lower abd cramping pain and loose stools x2d. This am noted BRB in the toilet x3 with possible clots. Never had these sx before. No recent abx, blood thinner use or travel. No lightheadedness/dizziness and SOB. Admits to palpitations. reports feeling hungry  had a colonoscopy 7/1/2021, was told she had tics. mammogram is UTD, annual physicals UTD (29 Jul 2021 12:29)      PAST MEDICAL & SURGICAL HISTORY:  Hypothyroid    Diverticulosis        MEDICATIONS  (STANDING):  ciprofloxacin   IVPB 400 milliGRAM(s) IV Intermittent every 12 hours  levothyroxine 137 MICROGram(s) Oral <User Schedule>  metroNIDAZOLE  IVPB 500 milliGRAM(s) IV Intermittent every 8 hours  sodium chloride 0.45%. 1000 milliLiter(s) (75 mL/Hr) IV Continuous <Continuous>      Allergies    penicillin (Rash)    Intolerances        SOCIAL HISTORY:  Denies ETOh,Smoking,     FAMILY HISTORY:      REVIEW OF SYSTEMS:    CONSTITUTIONAL: No weakness, fevers or chills  EYES/ENT: No visual changes;  No vertigo or throat pain   NECK: No pain or stiffness  RESPIRATORY: No cough, wheezing, hemoptysis; No shortness of breath  CARDIOVASCULAR: No chest pain or palpitations  GASTROINTESTINAL: No abdominal or epigastric pain. No nausea, vomiting, or hematemesis; No diarrhea or constipation. No melena or hematochezia.  GENITOURINARY: No dysuria, frequency or hematuria  NEUROLOGICAL: No numbness or weakness  SKIN: No itching, burning, rashes, or lesions   All other review of systems is negative unless indicated above.    VITAL:  T(C): , Max: 36.8 (07-30-21 @ 14:07)  T(F): , Max: 98.3 (07-30-21 @ 21:02)  HR: 86 (07-31-21 @ 08:57)  BP: 158/83 (07-31-21 @ 08:57)  BP(mean): --  RR: 20 (07-31-21 @ 08:57)  SpO2: 97% (07-31-21 @ 08:57)  Wt(kg): --    I and O's:    07-29 @ 07:01  -  07-30 @ 07:00  --------------------------------------------------------  IN: 1980 mL / OUT: 0 mL / NET: 1980 mL    07-30 @ 07:01  -  07-31 @ 07:00  --------------------------------------------------------  IN: 3460 mL / OUT: 0 mL / NET: 3460 mL          PHYSICAL EXAM:    Constitutional: NAD  HEENT: PERRLA,   Neck: No JVD  Respiratory: CTA B/L  Cardiovascular: S1 and S2  Gastrointestinal: BS+, soft, NT/ND  Extremities: No peripheral edema  Neurological: A/O x 3, no focal deficits  Psychiatric: Normal mood, normal affect  : No Maxwell  Skin: No rashes  Access: Not applicable  Back: No CVA tenderness    LABS:                        12.2   11.04 )-----------( 261      ( 31 Jul 2021 06:43 )             38.3     07-31    139  |  106  |  6<L>  ----------------------------<  97  3.8   |  23  |  0.70    Ca    9.3      31 Jul 2021 06:40            RADIOLOGY & ADDITIONAL STUDIES:                          
Patient is a 66y Female     Patient is a 66y old  Female who presents with a chief complaint of colitis (29 Jul 2021 16:39)      HPI:  65 yo woman with h/o hypothyroidism and diverticulosis who seen yesterday for a syncopal episode during BM. ptn was evaluated given IVF and sent home. Today continues to have lower abd cramping pain and loose stools x2d. This am noted BRB in the toilet x3 with possible clots. Never had these sx before. No recent abx, blood thinner use or travel. No lightheadedness/dizziness and SOB. Admits to palpitations. reports feeling hungry  had a colonoscopy 7/1/2021, was told she had tics. mammogram is UTD, annual physicals UTD (29 Jul 2021 12:29)      PAST MEDICAL & SURGICAL HISTORY:  Hypothyroid    Diverticulosis        MEDICATIONS  (STANDING):  ciprofloxacin   IVPB 400 milliGRAM(s) IV Intermittent every 12 hours  levothyroxine 137 MICROGram(s) Oral <User Schedule>  metroNIDAZOLE  IVPB 500 milliGRAM(s) IV Intermittent every 8 hours  sodium chloride 0.45%. 1000 milliLiter(s) (75 mL/Hr) IV Continuous <Continuous>      Allergies    penicillin (Rash)    Intolerances        SOCIAL HISTORY:  Denies ETOh,Smoking,     FAMILY HISTORY:      REVIEW OF SYSTEMS:    CONSTITUTIONAL: No weakness, fevers or chills  EYES/ENT: No visual changes;  No vertigo or throat pain   NECK: No pain or stiffness  RESPIRATORY: No cough, wheezing, hemoptysis; No shortness of breath  CARDIOVASCULAR: No chest pain or palpitations  GASTROINTESTINAL: No abdominal or epigastric pain. No nausea, vomiting, or hematemesis; No diarrhea or constipation. No melena or hematochezia.  GENITOURINARY: No dysuria, frequency or hematuria  NEUROLOGICAL: No numbness or weakness  SKIN: No itching, burning, rashes, or lesions   All other review of systems is negative unless indicated above.    VITAL:  T(C): , Max: 37.5 (07-29-21 @ 14:44)  T(F): , Max: 99.5 (07-29-21 @ 14:44)  HR: 92 (07-30-21 @ 05:21)  BP: 136/92 (07-30-21 @ 05:21)  BP(mean): --  RR: 18 (07-30-21 @ 05:21)  SpO2: 95% (07-30-21 @ 05:21)  Wt(kg): --    I and O's:    07-29 @ 07:01  -  07-30 @ 07:00  --------------------------------------------------------  IN: 1980 mL / OUT: 0 mL / NET: 1980 mL      Height (cm): 170.2 (07-29 @ 09:03)  Weight (kg): 72.6 (07-29 @ 09:03)  BMI (kg/m2): 25.1 (07-29 @ 09:03)  BSA (m2): 1.84 (07-29 @ 09:03)    PHYSICAL EXAM:    Constitutional: NAD  HEENT: PERRLA,   Neck: No JVD  Respiratory: CTA B/L  Cardiovascular: S1 and S2  Gastrointestinal: BS+, soft, NT/ND  Extremities: No peripheral edema  Neurological: A/O x 3, no focal deficits  Psychiatric: Normal mood, normal affect  : No Maxwell  Skin: No rashes  Access: Not applicable  Back: No CVA tenderness    LABS:                        12.1   13.79 )-----------( 247      ( 30 Jul 2021 06:28 )             37.2     07-30    137  |  103  |  6<L>  ----------------------------<  117<H>  3.4<L>   |  22  |  0.70    Ca    9.1      30 Jul 2021 06:28    TPro  8.1  /  Alb  4.5  /  TBili  0.7  /  DBili  x   /  AST  20  /  ALT  13  /  AlkPhos  94  07-29          RADIOLOGY & ADDITIONAL STUDIES:                          
Patient is a 66y old  Female who presents with a chief complaint of colitis (31 Jul 2021 11:15)      SUBJECTIVE / OVERNIGHT EVENTS: feels better, tolerating solids    MEDICATIONS  (STANDING):  ciprofloxacin   IVPB 400 milliGRAM(s) IV Intermittent every 12 hours  levothyroxine 137 MICROGram(s) Oral <User Schedule>  metroNIDAZOLE  IVPB 500 milliGRAM(s) IV Intermittent every 8 hours  sodium chloride 0.45%. 1000 milliLiter(s) (75 mL/Hr) IV Continuous <Continuous>    MEDICATIONS  (PRN):      Vital Signs Last 24 Hrs  T(F): 98.2 (07-31-21 @ 08:57), Max: 98.2 (07-31-21 @ 05:20)  HR: 82 (07-31-21 @ 09:45) (79 - 86)  BP: 138/67 (07-31-21 @ 09:45) (138/67 - 158/83)  RR: 20 (07-31-21 @ 08:57) (18 - 20)  SpO2: 97% (07-31-21 @ 08:57) (97% - 98%)    PHYSICAL EXAM:  GENERAL: NAD, well-developed  HEAD:  Atraumatic, Normocephalic  EYES: EOMI, PERRLA, conjunctiva and sclera clear  NECK: Supple, No JVD  CHEST/LUNG: Clear to auscultation bilaterally; No wheeze  HEART: Regular rate and rhythm; No murmurs, rubs, or gallops  ABDOMEN: Soft, Nontender, Nondistended; Bowel sounds present  EXTREMITIES:  2+ Peripheral Pulses, No clubbing, cyanosis, or edema  PSYCH: AAOx3  NEUROLOGY: non-focal  SKIN: No rashes or lesions    LABS:                        12.2   11.04 )-----------( 261      ( 31 Jul 2021 06:43 )             38.3     07-31    139  |  106  |  6<L>  ----------------------------<  97  3.8   |  23  |  0.70    Ca    9.3      31 Jul 2021 06:40                RADIOLOGY & ADDITIONAL TESTS:    Imaging Personally Reviewed:    Consultant(s) Notes Reviewed:      Care Discussed with Consultants/Other Providers:  
Infectious Diseases progress note:    Subjective:  Feeling much better, no abd cramping, n/v.  Had some gingerale, feels mildly gassy.  States had some stool with blood clots.  No further watery diarrhea.     ROS:  CONSTITUTIONAL:  No fever, chills, rigors  CARDIOVASCULAR:  No chest pain or palpitations  RESPIRATORY:   No SOB, cough, dyspnea on exertion.  No wheezing  GASTROINTESTINAL:  No abd pain, N/V, diarrhea/constipation  EXTREMITIES:  No swelling or joint pain  GENITOURINARY:  No burning on urination, increased frequency or urgency.  No flank pain  NEUROLOGIC:  No HA, visual disturbances  SKIN: No rashes    Allergies    penicillin (Rash)    Intolerances        ANTIBIOTICS/RELEVANT:  antimicrobials  ciprofloxacin   IVPB 400 milliGRAM(s) IV Intermittent every 12 hours  metroNIDAZOLE  IVPB 500 milliGRAM(s) IV Intermittent every 8 hours    immunologic:    OTHER:  levothyroxine 137 MICROGram(s) Oral <User Schedule>  sodium chloride 0.45%. 1000 milliLiter(s) IV Continuous <Continuous>      Objective:  Vital Signs Last 24 Hrs  T(C): 36.8 (2021 14:07), Max: 36.9 (2021 22:02)  T(F): 98.2 (2021 14:07), Max: 98.5 (2021 22:02)  HR: 82 (2021 14:07) (82 - 92)  BP: 136/87 (2021 14:07) (126/70 - 136/92)  BP(mean): --  RR: 18 (2021 14:07) (18 - 18)  SpO2: 97% (2021 14:07) (95% - 97%)    PHYSICAL EXAM:  Constitutional:NAD  Eyes:WILLIE, EOMI  Ear/Nose/Throat: no thrush, mucositis.  Moist mucous membranes	  Neck:no JVD, no lymphadenopathy, supple  Respiratory: CTA gavin  Cardiovascular: S1S2 RRR, no murmurs  Gastrointestinal:soft, nontender,  nondistended (+) BS  Extremities:no e/e/c  Skin:  no rashes, open wounds or ulcerations        LABS:                        12.1   13.79 )-----------( 247      ( 2021 06:28 )             37.2     07-30    137  |  103  |  6<L>  ----------------------------<  117<H>  3.4<L>   |  22  |  0.70    Ca    9.1      2021 06:28    TPro  8.1  /  Alb  4.5  /  TBili  0.7  /  DBili  x   /  AST  20  /  ALT  13  /  AlkPhos  94        Urinalysis Basic - ( 2021 21:11 )    Color: Light Yellow / Appearance: Clear / S.014 / pH: x  Gluc: x / Ketone: Negative  / Bili: Negative / Urobili: Negative   Blood: x / Protein: Negative / Nitrite: Negative   Leuk Esterase: Large / RBC: 2 /hpf / WBC 28 /HPF   Sq Epi: x / Non Sq Epi: 1 /hpf / Bacteria: Negative                          MICROBIOLOGY:  Culture Results:   GI PCR Results: NOT detected  *******Please Note:*******  GI panel PCR evaluates for:  Campylobacter, Plesiomonas shigelloides, Salmonella,  Vibrio, Yersinia enterocolitica, Enteroaggregative  Escherichia coli (EAEC), Enteropathogenic E.coli (EPEC),  Enterotoxigenic E. coli (ETEC) lt/st, Shiga-like  toxin-producing E. coli (STEC) stx1/stx2,  Shigella/ Enteroinvasive E. coli (EIEC), Cryptosporidium,  Cyclospora cayetanensis, Entamoeba histolytica,  Giardia lamblia, Adenovirus F 40/41, Astrovirus,  Norovirus GI/GII, Rotavirus A, Sapovirus ( @ 10:28)          RADIOLOGY & ADDITIONAL STUDIES:    < from: CT Angio Abdomen and Pelvis w/ IV Cont (21 @ 10:06) >    EXAM:  CT ANGIO ABD PELV (W)AW IC                            PROCEDURE DATE:  2021            INTERPRETATION:  CLINICAL INFORMATION: Evaluate for GI bleed.    COMPARISON: None.    CONTRAST/COMPLICATIONS:  IV Contrast: Omnipaque 350  90 cc administered   10 cc discarded  Oral Contrast: None  Complications: None reported at the time of study.    PROCEDURE:  CT of the Abdomen and Pelvis was performed.  Precontrast, Arterial and Delayed phases were performed.  Sagittal and coronal reformats were performed.    FINDINGS:  LOWER CHEST: Within normal limits.    LIVER: Within normal limits.  BILE DUCTS: Normal caliber.  GALLBLADDER: Within normal limits.  SPLEEN: Within normal limits.  PANCREAS: Within normal limits.  ADRENALS: Within normal limits.  KIDNEYS/URETERS: Normal opacification of the kidneys. No hydronephrosis.    BLADDER: Within normal limits.  REPRODUCTIVE ORGANS: Uterus and adnexa within normal limits.    BOWEL: No contrast extravasation to suggest an acute GI bleed. Pericolonic stranding throughout the mid to distal descending colon extending to the proximal sigmoid without any discrete diverticuli. No bowel obstruction. Appendix is normal.  PERITONEUM: No ascites.  VESSELS: Within normal limits.  RETROPERITONEUM/LYMPH NODES: No lymphadenopathy.  ABDOMINAL WALL: Within normal limits.  BONES: Mild degenerative changes of the lumbar spine.    IMPRESSION:    No acute GI bleed.  Findings suggestive of nonspecific enteritis possibly infectious in origin, correlate clinically.    < end of copied text >      < from: Xray Chest 2 Views PA/Lat (21 @ 20:27) >  FINDINGS:    Density at the right heart border, likely epicardial fat. Lungs are clear. No pleural effusion or pneumothorax.  Cardiac size is not enlarged. No acute osseous finding.    IMPRESSION:  Clear lungs    < end of copied text >

## 2021-07-31 NOTE — DISCHARGE NOTE PROVIDER - CARE PROVIDER_API CALL
Pieter Cancino (DO)  Gastroenterology; Internal Medicine  [address] [place] [address]  Phone: [phone]  Fax: [phone]  Follow Up Time:

## 2021-07-31 NOTE — DISCHARGE NOTE NURSING/CASE MANAGEMENT/SOCIAL WORK - PATIENT PORTAL LINK FT
You can access the FollowMyHealth Patient Portal offered by [facility] by registering at the following website: [email or web address]. By joining [facility]’s FollowMyHealth portal, you will also be able to view your health information using other applications (apps) compatible with our system.

## 2021-07-31 NOTE — PROGRESS NOTE ADULT - ASSESSMENT
67 yo woman with h/o hypothyroidism and diverticulosis who seen yesterday for a syncopal episode during BM. ptn was evaluated given IVF and sent home. Today continues to have lower abd cramping pain and loose stools x2d. This am noted BRB in the toilet x3 with possible clots. Never had these sx before. No recent abx, blood thinner use or travel. No lightheadedness/dizziness and SOB. Admits to palpitations. reports feeling hungry  had a colonoscopy 7/1/2021, was told she had tics. mammogram is UTD, annual physicals UTD    ED findings: elevated wbc to 17K, colitis on CT A/P  GI called, ptn was pancultured. ischemic colitis not ruled out but ptn w no CVS issues in the past nor atherosclerosis/DM/HTN. will place on clears, IV Cipro/Flagyl. awaiting ID and GI consults. dvt ppx w PAS 2/2 BRBPR  7/30: tolerating a regular diet, GI stool PCR is negative. DC planning when cleared by GI , prob on po ABx to complete course of ABx  7/31: tolearting solids, dc home on po AB as per GI/ID
chart reviewed, full consult to follow
conservatige gi mangemnt  ? infectious enteritis.  abx  bleeding ? outlet  d/c planning advance diet. 
67 yo woman with h/o hypothyroidism and diverticulosis who seen yesterday for a syncopal episode during BM. ptn was evaluated given IVF and sent home. Today continues to have lower abd cramping pain and loose stools x2d. This am noted BRB in the toilet x3 with possible clots. Never had these sx before. No recent abx, blood thinner use or travel. No lightheadedness/dizziness and SOB. Admits to palpitations. reports feeling hungry  had a colonoscopy 7/1/2021, was told she had tics. mammogram is UTD, annual physicals UTD    ED findings: elevated wbc to 17K, colitis on CT A/P  GI called, ptn was pancultured. ischemic colitis not ruled out but ptn w no CVS issues in the past nor atherosclerosis/DM/HTN. will place on clears, IV Cipro/Flagyl. awaiting ID and GI consults. dvt ppx w PAS 2/2 BRBPR  7/30: tolerating a regular diet, GI stool PCR is negative. DC planning when cleared by GI , prob on po ABx to complete course of ABx
67 yo woman with h/o hypothyroidism and diverticulosis who seen yesterday for a syncopal episode during BM. ptn was evaluated given IVF and sent home. Today continues to have lower abd cramping pain and loose stools x2d. This am noted BRB in the toilet x3 with possible clots. Never had these sx before. No recent abx, blood thinner use or travel. No lightheadedness/dizziness and SOB. Admits to palpitations. reports feeling hungry  had a colonoscopy 7/1/2021, was told she had tics. mammogram is UTD, annual physicals UTD (29 Jul 2021 12:29)    ER vss, Tmax 99.5 (R), P 86, /77.  WBC 17 <-- 7.6.  CRP 48.  UA large LE, (-) nit.  CTap shows nonspecific enteritis.  Pt states she had catered food the day prior to her abd symptoms, remembers eating chicken.   Denies recent travel, sick contacts, no recent abx exposure.  Pt started on broad spectrum abx.  ID consult called for further abx management.     Infectious enteritis:    - Recently ate catered food prior to onset of symptoms.  c/o lower abd cramping and loose stools with BRB.      - Cont cipro/flagyl.  Check stool gi pcr, stool cx, o&p.  No recent abx exposure to s/o cdiff risk.    - GI eval recommendations appreciated.     - If pt spikes fever, send bcx x 2    - Monitor serial abd exam, replete electrolytes prn, gentle ivf hydration, pain control      * GI pcr (-).  Feels better, still with stool with blood clots today.  No abd cramping.  Cont cipro/flagyl, treat for 7 to 10 days.  F/u stool cx, O&P    Will follow,    Annamaria Holguin  [phone]
enteritis with possible outlet bleed  continue conservaive ana  d/c plannikaushik.  d/w dr. rodríguez [facility] pm

## 2021-07-31 NOTE — DISCHARGE NOTE PROVIDER - NSDCMRMEDTOKEN_GEN_ALL_CORE_FT
Synthroid 137 mcg (0.137 mg) oral tablet: 0.137 milligram(s) orally once a day, except sunday   Cipro 500 mg oral tablet: 1 tab(s) orally every 12 hours   Flagyl 500 mg oral tablet: 1 tab(s) orally 3 times a day   Synthroid 137 mcg (0.137 mg) oral tablet: 0.137 milligram(s) orally once a day, except sunday

## 2021-07-31 NOTE — DISCHARGE NOTE PROVIDER - NSDCCPCAREPLAN_GEN_ALL_CORE_FT
PRINCIPAL DISCHARGE DIAGNOSIS  Diagnosis: Infectious enteritis  Assessment and Plan of Treatment: HOME CARE INSTRUCTIONS  Get plenty of rest.  Drink enough water and fluids to keep your urine clear or pale yellow.  Eat a well-balanced diet.  Call your caregiver for follow-up as recommended.  SEEK IMMEDIATE MEDICAL CARE IF:  You develop chills.  You have extreme weakness, fainting, or dehydration.  You have repeated vomiting.  You develop severe belly (abdominal) pain or are passing bloody or tarry stools

## 2021-08-02 LAB
CULTURE RESULTS: SIGNIFICANT CHANGE UP
CULTURE RESULTS: SIGNIFICANT CHANGE UP
SPECIMEN SOURCE: SIGNIFICANT CHANGE UP
SPECIMEN SOURCE: SIGNIFICANT CHANGE UP

## 2022-06-23 NOTE — PATIENT PROFILE ADULT - NSPRONUTRITIONRISK_GEN_A_NUR
Pt BIBA. tripped and fell from standing. fell and hit her head. laceration to back of the head. complaining of headache and sacral pain. denies LOC. denies blood thinners
No indicators present

## 2024-06-24 NOTE — PROGRESS NOTE ADULT - NSICDXPILOT_GEN_ALL_CORE
Refill Routing Note   Medication(s) are not appropriate for processing by [facility] for the following reason(s):        New or recently adjusted medication    [facility] action(s):  Defer               Appointments  past 12m or future 3m with PCP    Date Provider   Last Visit   3/25/2024 Rubin Rey MD   Next Visit   Visit date not found Rubin Rey MD   ED visits in past 90 days: 0        Note composed:2:29 AM 06/24/2024          
[place]

## 2025-05-13 NOTE — ED ADULT NURSE NOTE - NS ED NOTE  TALK SOMEONE YN
You were seen today in the Structural [facility] Clinic at the [facility].    Cardiology provider you saw during your visit: Carmel Weston NP    Instructions:   Continue aspirin 81 mg daily lifelong.  Increase Isordil to 20 mg twice daily  Let me know what dose of Ranexa you are on - if you are only taking once a day, increase to 500 mg twice a day  Have labs drawn today on your way out   I placed a pharmacy referral to get you on a more powerful cholesterol shot and possible weight loss shot   For all future dental cleanings and procedures you will need to take antibiotics prior - see instructions below.   I will talk to our team about whether your blockage is fixable with stenting rather than open heart   Follow-up with me in 3 months with labs prior     Prevention of Infective (Bacterial) Endocarditis:  You are at increased risk for developing adverse outcomes from infective endocarditis (IE), also known as bacterial endocarditis (BE) because of the new device in your heart. The guidelines for prevention of IE are to give patients antibiotics prior to any dental procedures that involve manipulation of gingival tissue or the periapical region of teeth, or perforation of the oral mucosa:      It is recommended to take Amoxicillin 2 gm by mouth as a single dose 30 to 60 minutes before procedure.     OR if allergic to Penicillin or Ampicillin:     Cephalexin 2 gm by mouth, or  Clindamycin 600 mg by mouth, or  Azithromycin or Clarithromycin 500 mg PO       Questions and scheduling:   First call: Structural Heart  Mckenna Reese [phone]    General scheduling line: [phone].   First press #1 for the University and then press #3 for Medical Questions to reach the Cardiology triage nurse.     On Call Cardiologist for after hours or on weekends: [phone], press option #4 and ask to speak to the on-call Cardiologist.      No